# Patient Record
Sex: MALE
[De-identification: names, ages, dates, MRNs, and addresses within clinical notes are randomized per-mention and may not be internally consistent; named-entity substitution may affect disease eponyms.]

---

## 2018-03-26 ENCOUNTER — HOSPITAL ENCOUNTER (EMERGENCY)
Dept: HOSPITAL 97 - ER | Age: 28
Discharge: HOME | End: 2018-03-26
Payer: COMMERCIAL

## 2018-03-26 VITALS — OXYGEN SATURATION: 97 %

## 2018-03-26 VITALS — TEMPERATURE: 97.8 F

## 2018-03-26 VITALS — SYSTOLIC BLOOD PRESSURE: 128 MMHG | DIASTOLIC BLOOD PRESSURE: 92 MMHG

## 2018-03-26 DIAGNOSIS — J02.9: Primary | ICD-10-CM

## 2018-03-26 PROCEDURE — 87081 CULTURE SCREEN ONLY: CPT

## 2018-03-26 PROCEDURE — 99285 EMERGENCY DEPT VISIT HI MDM: CPT

## 2018-03-26 PROCEDURE — 87804 INFLUENZA ASSAY W/OPTIC: CPT

## 2018-03-26 PROCEDURE — 96372 THER/PROPH/DIAG INJ SC/IM: CPT

## 2018-03-26 PROCEDURE — 87070 CULTURE OTHR SPECIMN AEROBIC: CPT

## 2018-03-26 NOTE — ER
Nurse's Notes                                                                                     

 [facility]                                                                

Name: Koko Soto                                                                                  

Age: 27 yrs                                                                                       

Sex: Male                                                                                         

: 1990                                                                                   

MRN: [id number]                                                                                   

Arrival Date: 2018                                                                          

Time: 17:33                                                                                       

Account#: [id number]                                                                            

Bed 14                                                                                            

Private MD:                                                                                       

Diagnosis: Acute pharyngitis                                                                      

                                                                                                  

Presentation:                                                                                     

                                                                                             

17:35 Presenting complaint: Patient states: 2 weeks ago, i have been diagnosed with           hj  

      bronchitis, finished with Rx antibiotics; now the cough is back and i have this pain on     

      the middle of my chest; reports SOB; reports on and off fever and cold sweats;.             

      Transition of care: patient was not received from another setting of care. Onset of         

      symptoms was 2018. Care prior to arrival: None.                                   

17:35 Method Of Arrival: Ambulatory                                                             

17:35 Acuity: CAS 3                                                                           hj  

                                                                                                  

Triage Assessment:                                                                                

17:37 General: Appears in no apparent distress. uncomfortable, Behavior is calm, cooperative, hj  

      appropriate for age. Pain: Complains of pain in chest. Cardiovascular: Capillary refill     

      < 3 seconds Patient's skin is warm and dry.                                                 

                                                                                                  

Historical:                                                                                       

- Allergies:                                                                                      

17:37 No Known Allergies;                                                                     hj  

- Home Meds:                                                                                      

17:37 None [Active];                                                                          hj  

- PMHx:                                                                                           

17:37 Asthma;                                                                                 hj  

- PSHx:                                                                                           

17:37 None;                                                                                   hj  

                                                                                                  

- Immunization history:: Adult Immunizations up to date.                                          

- Social history:: Smoking status: Patient/guardian denies using tobacco.                         

                                                                                                  

                                                                                                  

Screenin:03 Abuse screen: Denies threats or abuse. Nutritional screening: No deficits noted.        tw2 

      Tuberculosis screening: No symptoms or risk factors identified. Fall Risk None              

      identified.                                                                                 

                                                                                                  

Assessment:                                                                                       

17:37 Pain: Pain does not radiate. Pain began a week ago.                                     hj  

18:00 General: Appears in no apparent distress. obese, Behavior is calm, cooperative,         tw2 

      appropriate for age. Pain: Complains of pain in chest Pain does not radiate. Also           

      complains of shortness of breath, cough. Neuro: Level of Consciousness is awake, alert,     

      obeys commands, Oriented to person, place, time, situation. Cardiovascular: Heart tones     

      S1 S2 Capillary refill < 3 seconds Patient's skin is warm and dry. Respiratory: Reports     

      shortness of breath cough that is non-productive, dry, Airway is patent Respiratory         

      effort is even, unlabored, Respiratory pattern is regular, symmetrical, Breath sounds       

      are clear bilaterally. GI: Abdomen is round non-distended, Bowel sounds present X 4         

      quads. GI: No signs and/or symptoms were reported involving the gastrointestinal            

      system. GI: No signs and/or symptoms were reported involving the gastrointestinal           

      system. : No signs and/or symptoms were reported regarding the genitourinary system.      

      EENT: Reports nasal congestion nasal discharge family member in house has Flu A. Derm:      

      No signs and/or symptoms reported regarding the dermatologic system. Musculoskeletal:       

      Circulation, motion, and sensation intact. Range of motion: intact in all extremities.      

19:25 Reassessment: Patient appears in no apparent distress at this time. Patient and/or      kb1 

      family updated on plan of care and expected duration. Pain level reassessed. Patient is     

      alert, oriented x 3, equal unlabored respirations, skin warm/dry/pink.                      

                                                                                                  

Vital Signs:                                                                                      

17:38  / 46; Pulse 85; Resp 20; Temp 97.8(TE); Pulse Ox 95% on R/A; Weight 158.76 kg;     

      Height 5 ft. 7 in. (170.18 cm); Pain 6/10;                                                  

18:02  / 84; Pulse 76; Resp 17; Pulse Ox 97% on R/A;                                    tw2 

18:51  / 86; Pulse 103; Resp 20; Pulse Ox 97% on R/A;                                   mh5 

19:26  / 92; Pulse 82; Resp 18; Pulse Ox 97% ;                                          kb1 

17:38 Body Mass Index 54.82 (158.76 kg, 170.18 cm)                                              

                                                                                                  

ED Course:                                                                                        

17:33 Patient arrived in ED.                                                                  mr  

17:36 Triage completed.                                                                       hj  

17:37 Arm band placed on right wrist.                                                         hj  

17:37 Patient maintains SpO2 saturation greater than 95% on room air.                         hj  

18:00 Mallika Coles, EDILIA is Primary Nurse.                                                        tw2 

18:01 Chaz Morris NP is PHCP.                                                           pm1 

18:01 Jose Brooks MD is Attending Physician.                                              pm1 

18:02 Bed in low position. Call light in reach. Cardiac monitor on. Pulse ox on. NIBP on.     tw2 

18:03 No provider procedures requiring assistance completed.                                  tw2 

18:23 Flu Sent.                                                                               tw2 

18:23 Strep Sent.                                                                             tw2 

19:10 Report given to EDILIA Villa.                                                            tw2 

19:14 Awaiting: awaiting 15 min abx watch prior to discharge.                                 tw2 

19:15 Primary Nurse role handed off by Mallika Coles RN                                         tw2 

19:25 Daisy Gan, RN is Primary Nurse.                                                   kb1 

19:54 Patient did not have IV access during this emergency room visit.                        kb1 

                                                                                                  

Administered Medications:                                                                         

19:10 Drug: Rocephin (cefTRIAXone) 1 grams Route: IM; Site: left deltoid;                     tw2 

19:53 Follow up: Response: No adverse reaction                                                kb1 

                                                                                                  

                                                                                                  

Outcome:                                                                                          

19:04 Discharge ordered by MD.                                                                pm1 

19:53 Discharged to home ambulatory.                                                          kb1 

19:53 Condition: stable                                                                           

19:53 Discharge instructions given to patient, Instructed on discharge instructions, follow       

      up and referral plans. medication usage, Demonstrated understanding of instructions,        

      follow-up care, medications, Prescriptions given X 2.                                       

19:54 Patient left the ED.                                                                    kb1 

                                                                                                  

Signatures:                                                                                       

Aparna Duarte Henry RN                      RN   hj                                                   

Chaz Morris NP                    NP   pm1                                                  

Mallika Coles RN RN   [facility]                                                  

Aparna Fernandez                              [facility]                                                  

Daisy Gan RN                     RN   kb1                                                  

                                                                                                  

Corrections: (The following items were deleted from the chart)                                    

17:40 17:38 Pulse 85bpm; Resp 20bpm; Pulse Ox 95% RA; Temp 97.8F Temporal; 158.76 kg; Height  hj  

      5 ft. 7 in.; BMI: 54.8; Pain 6/10; hj                                                       

                                                                                                  

**************************************************************************************************

## 2018-03-26 NOTE — EDPHYS
Physician Documentation                                                                           

 [facility]                                                                

Name: Koko Soto                                                                                  

Age: 27 yrs                                                                                       

Sex: Male                                                                                         

: 1990                                                                                   

MRN: [id number]                                                                                   

Arrival Date: 2018                                                                          

Time: 17:33                                                                                       

Account#: [id number]                                                                            

Bed 14                                                                                            

Private MD:                                                                                       

ED Physician Jose Brooks                                                                       

HPI:                                                                                              

                                                                                             

19:15 This 27 yrs old  Male presents to ER via Ambulatory with complaints of Cough,   pm1 

      Chest Pain.                                                                                 

19:15 The patient or guardian reports cough, with no sputum. Onset: The symptoms/episode      pm1 

      began/occurred 3 day(s) ago. Severity of symptoms: in the emergency department the          

      symptoms are actually worse. Modifying factors: The symptoms are alleviated by nothing,     

      the symptoms are aggravated by nothing. Associated signs and symptoms: Pertinent            

      positives: chest pain, with cough, sore throat, Pertinent negatives: diarrhea, fever,       

      rhinorrhea.                                                                                 

19:15 The patient has been recently seen by a physician: 2 week(s) ago, [place] [facility]. Patient  pm1 

      with onset of symptoms three days ago with sore throat.                                     

                                                                                                  

Historical:                                                                                       

- Allergies:                                                                                      

17:37 No Known Allergies;                                                                     hj  

- Home Meds:                                                                                      

17:37 None [Active];                                                                          hj  

- PMHx:                                                                                           

17:37 Asthma;                                                                                 hj  

- PSHx:                                                                                           

17:37 None;                                                                                   hj  

                                                                                                  

- Immunization history:: Adult Immunizations up to date.                                          

- Social history:: Smoking status: Patient/guardian denies using tobacco.                         

                                                                                                  

                                                                                                  

ROS:                                                                                              

19:15 Constitutional: Negative for fever, chills, and weight loss, Eyes: Negative for injury, pm1 

      pain, redness, and discharge.                                                               

19:15 Neck: Negative for injury, pain, and swelling, Cardiovascular: Negative for chest pain,     

      palpitations, and edema.                                                                    

19:15 Abdomen/GI: Negative for abdominal pain, nausea, vomiting, diarrhea, and constipation,      

      Back: Negative for injury and pain, : Negative for injury, bleeding, discharge, and       

      swelling, MS/Extremity: Negative for injury and deformity, Skin: Negative for injury,       

      rash, and discoloration, Neuro: Negative for headache, weakness, numbness, tingling,        

      and seizure.                                                                                

19:15 ENT: Positive for sore throat, Negative for ear pain, rhinorrhea, difficulty                

      swallowing, difficulty handling secretions, hoarseness.                                     

19:15 Respiratory: Positive for cough, Negative for shortness of breath, sputum production,       

      wheezing.                                                                                   

                                                                                                  

Exam:                                                                                             

19:15 Constitutional:  This is a well developed, well nourished patient who is awake, alert,  pm1 

      and in no acute distress. Head/Face:  Normocephalic, atraumatic. Eyes:  Pupils equal        

      round and reactive to light, extra-ocular motions intact.  Lids and lashes normal.          

      Conjunctiva and sclera are non-icteric and not injected.  Cornea within normal limits.      

      Periorbital areas with no swelling, redness, or edema.                                      

19:15 Neck:  Trachea midline, no thyromegaly or masses palpated, and no cervical                  

      lymphadenopathy.  Supple, full range of motion without nuchal rigidity, or vertebral        

      point tenderness.  No Meningismus. Chest/axilla:  Normal chest wall appearance and          

      motion.  Nontender with no deformity.  No lesions are appreciated. Cardiovascular:          

      Regular rate and rhythm with a normal S1 and S2.  No gallops, murmurs, or rubs.  Normal     

      PMI, no JVD.  No pulse deficits. Respiratory:  Lungs have equal breath sounds               

      bilaterally, clear to auscultation and percussion.  No rales, rhonchi or wheezes noted.     

       No increased work of breathing, no retractions or nasal flaring. Abdomen/GI:  Soft,        

      non-tender, with normal bowel sounds.  No distension or tympany.  No guarding or            

      rebound.  No evidence of tenderness throughout. Back:  No spinal tenderness.  No            

      costovertebral tenderness.  Full range of motion. Skin:  Warm, dry with normal turgor.      

      Normal color with no rashes, no lesions, and no evidence of cellulitis. MS/ Extremity:      

      Pulses equal, no cyanosis.  Neurovascular intact.  Full, normal range of motion.            

19:15 ENT: External ear(s): are unremarkable, Ear canal(s): are normal, TM's: are normal,         

      Nose: is normal, Mouth: is normal, Posterior pharynx: Airway: normal, no evidence of        

      obstruction, patent, Tonsils: bilaterally enlarged, with erythema, with exudate, no         

      ulcerations, peritonsillar mass, is not appreciated, pooling of secretions, is not          

      appreciated.                                                                                

19:15 Neuro: Orientation: is normal, Motor: is normal, moves all fours, strength is normal,       

      strength is 5/5 in all extremities, Gait: is steady, at a normal pace, without              

      difficulty.                                                                                 

                                                                                                  

Vital Signs:                                                                                      

17:38  / 46; Pulse 85; Resp 20; Temp 97.8(TE); Pulse Ox 95% on R/A; Weight 158.76 kg;   hj  

      Height 5 ft. 7 in. (170.18 cm); Pain 6/10;                                                  

18:02  / 84; Pulse 76; Resp 17; Pulse Ox 97% on R/A;                                    tw2 

18:51  / 86; Pulse 103; Resp 20; Pulse Ox 97% on R/A;                                   mh5 

19:26  / 92; Pulse 82; Resp 18; Pulse Ox 97% ;                                          kb1 

17:38 Body Mass Index 54.82 (158.76 kg, 170.18 cm)                                            hj  

                                                                                                  

MDM:                                                                                              

18:01 Patient medically screened.                                                             pm1 

19:03 Data reviewed: vital signs. Data interpreted: Pulse oximetry: on room air is 97 %.      pm1 

      Interpretation: normal. Counseling: I had a detailed discussion with the patient and/or     

      guardian regarding: the historical points, exam findings, and any diagnostic results        

      supporting the discharge/admit diagnosis, lab results, the need for outpatient follow       

      up, to return to the emergency department if symptoms worsen or persist or if there are     

      any questions or concerns that arise at home.                                               

                                                                                                  

                                                                                             

18:11 Order name: Flu; Complete Time: 19:01                                                   pm1 

                                                                                             

18:11 Order name: Strep; Complete Time: 19:01                                                 pm1 

                                                                                             

18:37 Order name: Throat Culture                                                              EDMS

                                                                                                  

Administered Medications:                                                                         

19:10 Drug: Rocephin (cefTRIAXone) 1 grams Route: IM; Site: left deltoid;                     tw2 

19:53 Follow up: Response: No adverse reaction                                                kb1 

                                                                                                  

                                                                                                  

Disposition:                                                                                      

18 19:04 Discharged to Home. Impression: Acute pharyngitis.                                 

- Condition is Stable.                                                                            

- Discharge Instructions: Pharyngitis.                                                            

- Prescriptions for Zithromax Z- Bhaskar 250 mg Oral Tablet - take 1 tablet by ORAL route             

  as directed for 5 days Day 1 - take two (2) tablets one time. Day 2, 3, 4 , 5 take              

  one (1) tablet once daily.; 6 tablet. Guaifenesin AC 10- 100 mg/5 mL Oral Liquid -              

  take 10 milliliter by ORAL route every 4 hours As needed; 240 milliliter.                       

- Medication Reconciliation Form, Thank You Letter, Antibiotic Education, Work release            

  form form.                                                                                      

- Follow up: Emergency Department; When: As needed; Reason: Worsening of condition.               

  Follow up: Private Physician; When: 2 - 3 days; Reason: Recheck today's complaints,             

  Continuance of care, Re-evaluation by your physician.                                           

- Problem is new.                                                                                 

- Symptoms have improved.                                                                         

                                                                                                  

                                                                                                  

                                                                                                  

Addendum:                                                                                         

2018                                                                                        

     07:29 Co-signature as Attending Physician, Jose Brooks MD.                                  g
s

                                                                                                  

Signatures:                                                                                       

Dispatcher [employer]                           EDMS                                                 

Mook Nuñez, RN                      RN   hj                                                   

Chaz Morris NP                    NP   pm1                                                  

Mallika Coles RN                          RN   tw2                                                  

Jose Brooks MD MD                                                      

Daisy Gan, RN                     RN   kb1                                                  

                                                                                                  

**************************************************************************************************

## 2018-10-02 ENCOUNTER — HOSPITAL ENCOUNTER (EMERGENCY)
Dept: HOSPITAL 97 - ER | Age: 28
Discharge: HOME | End: 2018-10-02
Payer: COMMERCIAL

## 2018-10-02 VITALS — SYSTOLIC BLOOD PRESSURE: 153 MMHG | DIASTOLIC BLOOD PRESSURE: 92 MMHG | OXYGEN SATURATION: 97 % | TEMPERATURE: 97.2 F
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
## 2018-10-02 DIAGNOSIS — S50.861A: Primary | ICD-10-CM
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
## 2018-10-02 PROCEDURE — 99283 EMERGENCY DEPT VISIT LOW MDM: CPT

## 2018-10-02 NOTE — EDPHYS
Physician Documentation                                                                           

 [facility]                                                                

Name: Koko Soto                                                                                  

Age: 28 yrs                                                                                       

Sex: Male                                                                                         

: 1990                                                                                   

MRN: [id number]                                                                                   

Arrival Date: 10/02/2018                                                                          

Time: 09:33                                                                                       

Account#: [id number]                                                                            

Bed 12                                                                                            

Private MD: Madhu Cochran E ED Physician Josafat Bautista                                                                      

HPI:                                                                                              

10/02                                                                                             

10:30 This 28 yrs old  Male presents to ER via Ambulatory with complaints of Insect   shama 

      Bite.                                                                                       

10:30 The patient or guardian complains of an abrasion, pain, swelling. The complaints affect shama 

      the palmar aspect of right forearm. Context: The problem was sustained at home. Onset:      

      The symptoms/episode began/occurred 2 day(s) ago. Treatment prior to arrival includes:      

      no previous treatment. Associated signs and symptoms: The patient has no apparent           

      associated signs or symptoms. The patient has not experienced similar symptoms in the       

      past.                                                                                       

                                                                                                  

Historical:                                                                                       

- Allergies:                                                                                      

09:43 No Known Allergies;                                                                     ss  

- Home Meds:                                                                                      

:43 None [Active];                                                                          ss  

- PMHx:                                                                                           

:43 Asthma;                                                                                 ss  

- PSHx:                                                                                           

:43 None;                                                                                   ss  

                                                                                                  

- Immunization history:: Adult Immunizations unknown.                                             

- Social history:: Smoking status: Patient/guardian denies using tobacco.                         

- Ebola Screening: : Patient denies exposure to infectious person Patient denies travel           

  to an Ebola-affected area in the 21 days before illness onset.                                  

- Family history:: not pertinent.                                                                 

                                                                                                  

                                                                                                  

ROS:                                                                                              

10:30 Constitutional: Negative for fever, chills, and weight loss, Eyes: Negative for injury, shama 

      pain, redness, and discharge, ENT: Negative for injury, pain, and discharge, Neck:          

      Negative for injury, pain, and swelling, Cardiovascular: Negative for chest pain,           

      palpitations, and edema, Respiratory: Negative for shortness of breath, cough,              

      wheezing, and pleuritic chest pain, Abdomen/GI: Negative for abdominal pain, nausea,        

      vomiting, diarrhea, and constipation, Back: Negative for injury and pain, : Negative      

      for injury, bleeding, discharge, and swelling, Skin: Negative for injury, rash, and         

      discoloration, Neuro: Negative for headache, weakness, numbness, tingling, and seizure,     

      Psych: Negative for depression, anxiety, suicide ideation, homicidal ideation, and          

      hallucinations, Allergy/Immunology: Negative for hives, rash, and allergies, Endocrine:     

      Negative for neck swelling, polydipsia, polyuria, polyphagia, and marked weight             

      changes, Hematologic/Lymphatic: Negative for swollen nodes, abnormal bleeding, and          

      unusual bruising.                                                                           

10:30 MS/extremity: Positive for pain, swelling, tenderness, of the palmar aspect of right        

      forearm.                                                                                    

                                                                                                  

Exam:                                                                                             

10:30 Constitutional:  This is a well developed, well nourished patient who is awake, alert,  shama 

      and in no acute distress. Head/Face:  Normocephalic, atraumatic. Eyes:  Pupils equal        

      round and reactive to light, extra-ocular motions intact.  Lids and lashes normal.          

      Conjunctiva and sclera are non-icteric and not injected.  Cornea within normal limits.      

      Periorbital areas with no swelling, redness, or edema. ENT:  Nares patent. No nasal         

      discharge, no septal abnormalities noted.  Tympanic membranes are normal and external       

      auditory canals are clear.  Oropharynx with no redness, swelling, or masses, exudates,      

      or evidence of obstruction, uvula midline.  Mucous membranes moist. Neck:  Trachea          

      midline, no thyromegaly or masses palpated, and no cervical lymphadenopathy.  Supple,       

      full range of motion without nuchal rigidity, or vertebral point tenderness.  No            

      Meningismus. Chest/axilla:  Normal chest wall appearance and motion.  Nontender with no     

      deformity.  No lesions are appreciated. Cardiovascular:  Regular rate and rhythm with a     

      normal S1 and S2.  No gallops, murmurs, or rubs.  Normal PMI, no JVD.  No pulse             

      deficits. Respiratory:  Lungs have equal breath sounds bilaterally, clear to                

      auscultation and percussion.  No rales, rhonchi or wheezes noted.  No increased work of     

      breathing, no retractions or nasal flaring. Abdomen/GI:  Soft, non-tender, with normal      

      bowel sounds.  No distension or tympany.  No guarding or rebound.  No evidence of           

      tenderness throughout. Back:  No spinal tenderness.  No costovertebral tenderness.          

      Full range of motion. Neuro:  Awake and alert, GCS 15, oriented to person, place, time,     

      and situation.  Cranial nerves II-XII grossly intact.  Motor strength 5/5 in all            

      extremities.  Sensory grossly intact.  Cerebellar exam normal.  Normal gait. Psych:         

      Awake, alert, with orientation to person, place and time.  Behavior, mood, and affect       

      are within normal limits.                                                                   

10:30 Skin: Appearance: Color: normal in color, Temperature: warm, Moisture: normal moisture,     

      petechiae, not noted, ecchymosis, not noted, injury, abrasion(s), bite(s), superficial.     

                                                                                                  

Vital Signs:                                                                                      

09:43  / 92; Pulse 83; Resp 15; Temp 97.2(TE); Pulse Ox 97% on R/A; Weight 136.08 kg;   ss  

      Height 5 ft. 7 in. (170.18 cm); Pain 0/10;                                                  

09:43 Body Mass Index 46.99 (136.08 kg, 170.18 cm)                                              

                                                                                                  

MDM:                                                                                              

09:52 Patient medically screened.                                                             [facility] 

10:32 Data reviewed: vital signs, nurses notes.                                               [facility] 

                                                                                                  

Administered Medications:                                                                         

10:34 Drug: Bactrim (160 mg-800 mg (DS) 1 tablet Route: PO;                                     

10:40 Follow up: Response: No adverse reaction; Medication administered at discharge.           

10:40 Drug: Bactroban Ointment 2 % 1 application Route: Topical; Site: affected area;           

                                                                                                  

                                                                                                  

Disposition:                                                                                      

10/02/18 10:32 Discharged to Home. Impression: Insect bite (nonvenomous) of forearm.              

- Condition is Stable.                                                                            

- Discharge Instructions: Insect Bite, Easy-to-Read, Insect Bite.                                 

- Prescriptions for Bactroban 2 % Topical Ointment - Apply to affected area 1                     

  application by TOPICAL route every 12 hours; 30 gram. Claritin 10 mg Oral Tablet -              

  take 1 tablet by ORAL route once daily As needed; 20 tablet. Bactrim - 160 mg             

  Oral Tablet - take 1 tablet by ORAL route every 12 hours for 7 days; 14 tablet.                 

- Medication Reconciliation Form, Thank You Letter, Antibiotic Education, Prescription            

  Opioid Use form.                                                                                

- Follow up: Madhu Cochran MD; When: 2 - 3 days; Reason: Recheck today's complaints,            

  Continuance of care, Re-evaluation by your physician.                                           

                                                                                                  

                                                                                                  

                                                                                                  

Signatures:                                                                                       

Josafat Bautista MD MD cha Smirch, Shelby RN                      RN   ss                                                   

                                                                                                  

Corrections: (The following items were deleted from the chart)                                    

10:41 10:32 10/02/2018 10:32 Discharged to Home. Impression: Insect bite (nonvenomous) of     ss  

      forearm. Condition is Stable. Forms are Medication Reconciliation Form, Thank You           

      Letter, Antibiotic Education, Prescription Opioid Use. Follow up: Madhu Cochran; When:      

      2 - 3 days; Reason: Recheck today's complaints, Continuance of care, Re-evaluation by       

      your physician. shama                                                                         

                                                                                                  

**************************************************************************************************

## 2021-01-14 ENCOUNTER — HOSPITAL ENCOUNTER (EMERGENCY)
Dept: HOSPITAL 97 - ER | Age: 31
Discharge: HOME | End: 2021-01-14
Payer: SELF-PAY

## 2021-01-14 VITALS — DIASTOLIC BLOOD PRESSURE: 81 MMHG | SYSTOLIC BLOOD PRESSURE: 109 MMHG | OXYGEN SATURATION: 100 %

## 2021-01-14 VITALS — TEMPERATURE: 98.8 F

## 2021-01-14 DIAGNOSIS — Z20.822: ICD-10-CM

## 2021-01-14 DIAGNOSIS — R19.7: Primary | ICD-10-CM

## 2021-01-14 LAB
ALBUMIN SERPL BCP-MCNC: 3.7 G/DL (ref 3.4–5)
ALP SERPL-CCNC: 98 U/L (ref 45–117)
ALT SERPL W P-5'-P-CCNC: 40 U/L (ref 12–78)
AST SERPL W P-5'-P-CCNC: 19 U/L (ref 15–37)
BUN BLD-MCNC: 13 MG/DL (ref 7–18)
GLUCOSE SERPLBLD-MCNC: 78 MG/DL (ref 74–106)
HCT VFR BLD CALC: 46.2 % (ref 39.6–49)
LIPASE SERPL-CCNC: 216 U/L (ref 73–393)
LYMPHOCYTES # SPEC AUTO: 2.5 K/UL (ref 0.7–4.9)
PMV BLD: 8.5 FL (ref 7.6–11.3)
POTASSIUM SERPL-SCNC: 3.9 MMOL/L (ref 3.5–5.1)
RBC # BLD: 5.13 M/UL (ref 4.33–5.43)
SARS-COV-2 RNA RESP QL NAA+PROBE: NEGATIVE

## 2021-01-14 PROCEDURE — 85025 COMPLETE CBC W/AUTO DIFF WBC: CPT

## 2021-01-14 PROCEDURE — 80048 BASIC METABOLIC PNL TOTAL CA: CPT

## 2021-01-14 PROCEDURE — 80076 HEPATIC FUNCTION PANEL: CPT

## 2021-01-14 PROCEDURE — 99283 EMERGENCY DEPT VISIT LOW MDM: CPT

## 2021-01-14 PROCEDURE — 96360 HYDRATION IV INFUSION INIT: CPT

## 2021-01-14 PROCEDURE — 0240U: CPT

## 2021-01-14 PROCEDURE — 96361 HYDRATE IV INFUSION ADD-ON: CPT

## 2021-01-14 PROCEDURE — 83690 ASSAY OF LIPASE: CPT

## 2021-01-14 PROCEDURE — 36415 COLL VENOUS BLD VENIPUNCTURE: CPT

## 2021-01-14 NOTE — XMS REPORT
Continuity of Care Document

                           Created on:2021



Patient:ANISHA CÁRDENAS

Sex:Male

:1990

External Reference #:[id number]





Demographics







                          Address                   [address] [place] [address]

 

                          Home Phone                [phone]

 

                          Work Phone                [phone]

 

                          Mobile Phone              [phone]

 

                          Email Address             [address]

 

                          Preferred Language        English

 

                          Marital Status            Unknown

 

                          Gnosticism Affiliation     Unknown

 

                          Race                      Unknown

 

                          Additional Race(s)        Unavailable



                                                    White

 

                          Ethnic Group              Unknown









Author







                          Organization              [facility]

t

 

                          Address                   [address]



                                                    [place] [address]

 

                          Phone                     [phone]









Support







                Name            Relationship    Address         Phone

 

                Yg        Mother          [address]     [phone]



                                                [place] [address] 









Care Team Providers







                    Name                Role                Phone

 

                    Jojo YEBOAH             Attending Clinician Unavailable

 

                    Juan CAICEDO,  F    Attending Clinician [phone]

 

                    Tho CAICEDO,  B    Attending Clinician [phone]

 

                    Doctor Unassigned,  Name Attending Clinician Unavailable

 

                    Harry CAICEDO         Attending Clinician [phone]

 

                    Collin MARIA  S       Attending Clinician [phone]









Problems

This patient has no known problems.



Allergies, Adverse Reactions, Alerts

This patient has no known allergies or adverse reactions.



Medications

This patient has no known medications.



Procedures

This patient has no known procedures.



Encounters







        Start   End     Encounter Admission Attending Care    Care    Encounter 

Source



        Date/Time Date/Time Type    Type    Clinicians Facility Department ID   

   

 

        2020-10-30 2020-10-30 Letter          Susan Uribe    [id number]

[id number] 



        00:00:00 00:00:00 (Out)                   [facility]   [id number]         



                                                [facility] [id number]         



                                                        [phone]         



                                                        019             

 

        2020-10-29 2020-10-29 Emergency         SALOME Martinez    [id number]

[id number] 



        17:25:00 21:45:00                 Ju Turk 350.1.13.10        

 



                                                [facility] [id number]         



                                                [place]  [phone]         



                                                        084             

 

        2020 Emergency         SALOME Nettles    [id number]

[id number] 



        12:45:55 13:57:00                 Roderick Turk [id number]         



                                                [facility] [id number]         



                                                [place]  [phone]         



                                                        084             

 

        2020 Orders          Doctor  KASH    [id number]

[id number] 



        00:00:00 00:00:00 Only            Unassigned, [facility]   350.1.13.10       

  



                                        [facility] [id number]         



                                                        [phone]         



                                                        009             

 

        2020 Emergency         CatherineLucretia silva [facility]    [id number]

[id number] 



        16:59:17 21:36:00                 Nessa Polanco 350.1.13.10 

        



                                        Nessa Polanco [id number]    

     



                                                [place]  [phone]         



                                                        084             







Results

This patient has no known results.

## 2021-01-14 NOTE — XMS REPORT
Summary of Care

                           Created on:2020



Patient:Koko Soto

Sex:Male

:1990

External Reference #:[id number]





Demographics







                          Address                   [address] [place] [address]

 

                          Mobile Phone              [phone]

 

                          Home Phone                [phone]

 

                          Phone                     [phone]

 

                          Email Address             [email or web address]

 

                          Email Address             [email or web address]

 

                          Preferred Language        English

 

                          Marital Status            Single

 

                          Hinduism Affiliation     Unknown

 

                          Race                      White

 

                          Ethnic Group              Not  or 









Author







                          Organization              [facility]

 

                          Address                   [address] [place] [id number]









Support







                Name            Relationship    Address         Phone

 

                Maite Ronquillo Unavailable     [address]     [phone]



                                                [place] [address] 









Care Team Providers







                    Name                Role                Phone

 

                    Madhu Cochran    Primary Care Provider [phone]









Encounter Details







             Date         Type         Department   Care Team    Description

 

                    10/30/2020          Letter (Out)        [facility]



                          Susan Uribe RN            



                                                            [address] [place] [id number] [phone]              







Allergies

No Known Allergiesdocumented as of this encounter (statuses as of 10/30/2020)



Medications







          Medication Sig       Dispensed Refills   Start Date End Date  Status

 

          albuterol (VENTOLIN) 90 Inhale 2 Puffs 8.5 g     0         2015 

          Active



          mcg/actuation inhaler every 4 (four)                                  

       



                    hours as needed                                         



                    for Wheezing or                                         



                    Shortness of                                         



                    Breath.                                           

 

          albuterol 90 Inhale 2 Puffs 8.5 g     0         2018           A

ctive



          mcg/actuation inhaler every 4 (four)                                  

       



                    hours as needed                                         



                    for Wheezing or                                         



                    Shortness of                                         



                    Breath.                                           

 

          azithromycin 250 mg Take 1 tablet by 6 tablet  0         2018   

        Active



          tablet    mouth                                             



                    SEE-INSTRUCTIONS.                                         



                    Take 500 mg day                                         



                    1, then 250 mg                                         



                    days 2 to 5.                                         

 

          codeine-guaifenesin Take 10 mL by 240 mL    0         2018      

     Active



           mg/5 mL solution mouth every 6                                 

        



                    (six) hours as                                         



                    needed for Cough.                                         

 

          dicyclomine 20 mg Take 1 tablet by 20 tablet 0         2020     

      Active



          tabletIndications: mouth every 6                                      

   



          Generalized abdominal (six) hours as                                  

       



          pain      needed for                                         



                    Abdominal pain.                                         

 

          ondansetron (ZOFRAN) 4 Take 1 tablet by 12 tablet 0         2020

           Active



          mg tabletIndications: mouth every 8                                   

      



          Non-intractable (eight) hours as                                      

   



          vomiting with nausea, needed for Nausea                               

          



          unspecified vomiting and Vomiting                                     

    



          type      (N/V).                                            

 

          albuterol 90 Inhale 2 Puffs 8.5 g     0         2020           A

ctive



          mcg/actuation every 4 (four)                                         



          inhalerIndications: hours as needed                                   

      



          Suspected COVID-19 for Wheezing or                                    

     



          virus infection Shortness of                                         



                    Breath.                                           

 

          dextromethorphan-guaife Take 10 mL by 1 Bottle  0         2020  

         Active



          nesin  mg/5 mL mouth every 6                                    

     



          syrupIndications: (six) hours as                                      

   



          Suspected COVID-19 needed for Cough.                                  

       



          virus infection                                                   

 

          codeine-guaifenesin Take 5 mL by 118 mL    0         2020       

    Active



           mg/5 mL mouth every 6                                         



          solutionIndications: (six) hours as                                   

      



          Suspected COVID-19 needed for Cough                                   

      



          virus infection (bedtime).                                         

 

          ondansetron 4 mg Take 1 tablet by 12 tablet 0         10/29/2020      

     Active



          disintegrating mouth every 8                                         



          tabletIndications: (eight) hours as                                   

      



          Nausea    needed for Nausea                                         



                    and Vomiting                                         



                    (N/V).                                            



documented as of this encounter (statuses as of 10/30/2020)



Active Problems

No known active problemsdocumented as of this encounter (statuses as of 
10/30/2020)



Social History







             Tobacco Use  Types        Packs/Day    Years Used   Date

 

             Never Assessed                                        









                          Sex Assigned at Birth     Date Recorded

 

                          Not on file               









                    COVID-19 Exposure   Response            Date Recorded

 

                    In the last month, have you been in contact with No / Unsure

         10/29/2020  5:19 PM

CDT



                    someone who was confirmed or suspected to have              

       



                    Coronavirus / COVID-19?                     



documented as of this encounter



Last Filed Vital Signs

Not on filedocumented in this encounter



Plan of Treatment







                Health Maintenance Due Date        Last Done       Comments

 

                VARICELLA VACCINES (1 of 2 - 1990                      



                2-dose childhood series)                                 

 

                Depression Screening 2001                      

 

                DTaP,Tdap,and Td Vaccines (1 - 2008                      



                Tdap)                                           

 

                INFLUENZA VACCINE (#1) 2020                      

 

                PNEUMOCOCCAL 0-64 YEARS COMBINED Aged Out                       

 No longer eligible based on



                SERIES                                          patient's age to

 complete this



                                                                topic



documented as of this encounter



Results

Not on filedocumented in this encounter



Additional Health Concerns







                Infection       Onset Date      Last Indicated  Resolved Time

 

                COVID-19 Rule Out 10/29/2020      10/29/2020      10/30/2020  4:

23 PM CDT



documented as of this encounter

## 2021-01-14 NOTE — XMS REPORT
Summary of Care

                           Created on:2020



Patient:Koko Soto

Sex:Male

:1990

External Reference #:[id number]





Demographics







                          Address                   [address] [place] [address]

 

                          Mobile Phone              [phone]

 

                          Home Phone                [phone]

 

                          Phone                     [phone]

 

                          Email Address             [email or web address]

 

                          Email Address             [email or web address]

 

                          Preferred Language        English

 

                          Marital Status            Single

 

                          Druze Affiliation     Unknown

 

                          Race                      White

 

                          Ethnic Group              Not  or 









Author







                          Organization              [facility]

 

                          Address                   [address] [place] [address]









Support







                Name            Relationship    Address         Phone

 

                Maite Ronquillo Unavailable     [address]     [phone]



                                                [place] [address] 









Care Team Providers







                    Name                Role                Phone

 

                    Madhu Cochran    Primary Care Provider [phone]









Reason for Referral

MRI/CAT Scan (STAT)





           Status     Reason     Specialty  Diagnoses / Referred By Referred To



                                            Procedures Contact    Contact

 

                New Request                     Diagnostic      Diagnoses



Diarrhea, unspecified type



Epigastric pain           Ibikunle,                 



                                                Radiology       



Procedures



CT ABDOMEN PELVIS W CONTRAST            Folusho F, FNP



                                        



                                                                 [address]



                                        



                                                                 [address]



                                        



                                                       [place] 



                                                                 [address]



                                        



                                                       Phone:     



                                                                 [phone]



                                        



                                                       Fax:       



                                                       [phone] 









Reason for Visit







                          Reason                    Comments

 

                          Diarrhea                  x 2 days

 

                          Chills                    



Auth/Cert





           Status     Reason     Specialty  Diagnoses / Referred By Referred To



                                            Procedures Contact    Contact

 

                                 Emergency Medicine                       [facility]

ergency



                                                                  Dept







                                                                  [address] [place], [place]



                                                                  [address]







                                                                  Phone:



                                                                  [phone]







                                                                  Fax: [phone]









Encounter Details







             Date         Type         Department   Care Team    Description

 

             10/29/2020   Emergency    [facility] [place]le, Folusho Diarrhea,

 unspecified type 

(Primary Dx);



                                                            Department



                                        F, FNP



                                        Epigastric pain;



                                                [address] [place] [address] [address]



                                        Gastroenteritis;



                                                            Drive



                                        [address]



                                        [place] [address]



                          [place]             



                                                [phone] [address] [phone] (Fax) 







Allergies

No Known Allergiesdocumented as of this encounter (statuses as of 10/29/2020)



Medications







          Medication Sig       Dispensed Refills   Start Date End Date  Status

 

          albuterol (VENTOLIN) 90 Inhale 2 Puffs 8.5 g     0         2015 

          Active



          mcg/actuation inhaler every 4 (four)                                  

       



                    hours as needed                                         



                    for Wheezing or                                         



                    Shortness of                                         



                    Breath.                                           

 

          albuterol 90 Inhale 2 Puffs 8.5 g     0         2018           A

ctive



          mcg/actuation inhaler every 4 (four)                                  

       



                    hours as needed                                         



                    for Wheezing or                                         



                    Shortness of                                         



                    Breath.                                           

 

          azithromycin 250 mg Take 1 tablet by 6 tablet  0         2018   

        Active



          tablet    mouth                                             



                    SEE-INSTRUCTIONS.                                         



                    Take 500 mg day                                         



                    1, then 250 mg                                         



                    days 2 to 5.                                         

 

          codeine-guaifenesin Take 10 mL by 240 mL    0         2018      

     Active



           mg/5 mL solution mouth every 6                                 

        



                    (six) hours as                                         



                    needed for Cough.                                         

 

          dicyclomine 20 mg Take 1 tablet by 20 tablet 0         2020     

      Active



          tabletIndications: mouth every 6                                      

   



          Generalized abdominal (six) hours as                                  

       



          pain      needed for                                         



                    Abdominal pain.                                         

 

          ondansetron (ZOFRAN) 4 Take 1 tablet by 12 tablet 0         2020

           Active



          mg tabletIndications: mouth every 8                                   

      



          Non-intractable (eight) hours as                                      

   



          vomiting with nausea, needed for Nausea                               

          



          unspecified vomiting and Vomiting                                     

    



          type      (N/V).                                            

 

          albuterol 90 Inhale 2 Puffs 8.5 g     0         2020           A

ctive



          mcg/actuation every 4 (four)                                         



          inhalerIndications: hours as needed                                   

      



          Suspected COVID-19 for Wheezing or                                    

     



          virus infection Shortness of                                         



                    Breath.                                           

 

          dextromethorphan-guaife Take 10 mL by 1 Bottle  0         2020  

         Active



          nesin  mg/5 mL mouth every 6                                    

     



          syrupIndications: (six) hours as                                      

   



          Suspected COVID-19 needed for Cough.                                  

       



          virus infection                                                   

 

          codeine-guaifenesin Take 5 mL by 118 mL    0         2020       

    Active



           mg/5 mL mouth every 6                                         



          solutionIndications: (six) hours as                                   

      



          Suspected COVID-19 needed for Cough                                   

      



          virus infection (bedtime).                                         

 

          ondansetron 4 mg Take 1 tablet by 12 tablet 0         10/29/2020      

     Active



          disintegrating mouth every 8                                         



          tabletIndications: (eight) hours as                                   

      



          Nausea    needed for Nausea                                         



                    and Vomiting                                         



                    (N/V).                                            



documented as of this encounter (statuses as of 10/29/2020)



Active Problems

No known active problemsdocumented as of this encounter (statuses as of 
10/29/2020)



Social History







             Tobacco Use  Types        Packs/Day    Years Used   Date

 

             Never Assessed                                        









                          Sex Assigned at Birth     Date Recorded

 

                          Not on file               









                    COVID-19 Exposure   Response            Date Recorded

 

                    In the last month, have you been in contact with No / Unsure

         10/29/2020  5:19 PM

CDT



                    someone who was confirmed or suspected to have              

       



                    Coronavirus / COVID-19?                     



documented as of this encounter



Last Filed Vital Signs







                Vital Sign      Reading         Time Taken      Comments

 

                Blood Pressure  147/93          10/29/2020  8:00 PM CDT 

 

                Pulse           75              10/29/2020  8:00 PM CDT 

 

                Temperature     37.2 C (99 F) 10/29/2020  5:21 PM CDT 

 

                Respiratory Rate 18              10/29/2020  8:00 PM CDT 

 

                Oxygen Saturation 96%             10/29/2020  8:00 PM CDT 

 

                Inhaled Oxygen Concentration -               -               

 

                Weight          176 kg (388 lb) 10/29/2020  5:23 PM CDT 

 

                Height          170.2 cm (5' 7") 10/29/2020  5:23 PM CDT 

 

                Body Mass Index 60.77           10/29/2020  5:23 PM CDT 



documented in this encounter



Discharge Instructions

Ju Gordon FNP - 10/29/2020Formatting of this note might 
be different from the original.

You were seen today for

Chief Complaint

Patient presents with

 Diarrhea

  x 2 days

 Chills



Your ER diagnosis was

  ICD-10-CM ICD-9-CM

1. Diarrhea, unspecified type  R19.7 787.91

2. Epigastric pain  R10.13 789.06

3. Gastroenteritis  K52.9 558.9



NO LIFE-THREATENING FINDINGS ON TODAY'S EXAM.



YOUR PRESCRIPTIONS :



Medication List





ASK your doctor about these medications



* albuterol 90 mcg/actuation inhaler

Commonly known as: VENTOLIN

Inhale 2 Puffs every 4 (four) hours as needed for Wheezing or Shortness of 
Breath.



* albuterol 90 mcg/actuation inhaler

Commonly known as: VENTOLIN

Inhale 2 Puffs every 4 (four) hours as needed for Wheezing or Shortness of 
Breath.



* albuterol 90 mcg/actuation inhaler

Commonly known as: VENTOLIN

Inhale 2 Puffs every 4 (four) hours as needed for Wheezing or Shortness of 
Breath.



azithromycin 250 mg tablet

Commonly known as: ZITHROMAX

Take 1 tablet by mouth SEE-INSTRUCTIONS. Take 500 mg day 1, then 250 mg days 2 
to 5.



* codeine-guaifenesin  mg/5 mL solution

Commonly known as: ROBITUSSIN AC

Take 10 mL by mouth every 6 (six) hours as needed for Cough.



* codeine-guaifenesin  mg/5 mL solution

Commonly known as: ROBITUSSIN AC

Take 5 mL by mouth every 6 (six) hours as needed for Cough (bedtime).



dextromethorphan-guaifenesin  mg/5 mL syrup

Commonly known as: ROBITUSSIN-DM

Take 10 mL by mouth every 6 (six) hours as needed for Cough.



dicyclomine 20 mg tablet

Commonly known as: BENTYL

Take 1 tablet by mouth every 6 (six) hours as needed for Abdominal pain.



ondansetron 4 mg tablet

Commonly known as: Zofran

Take 1 tablet by mouth every 8 (eight) hours as needed for Nausea and Vomiting 
(N/V).







 * This list has 5 medication(s) that are the same as other medications 
prescribed for you. Read thedirections carefully, and ask your doctor or other 
care provider to review them with you.











ER precautions and follow up :

1.  Return to ER if your symptoms should worsen or fail to improve within 72 
hours.

2.  The care provided in the emergency room was for acute problems only.

3.  You should follow up with your primary care provider within 72 hours.

4.  Fill and take all your medications as prescribed.

5.  Make sure you are staying adequately hydrated.



Busque attencion immediatamente si usted tiene los sitomas sigue, vuelve peor o 
si hay sitomas nuevas o para cualquiera preoccupacion incluyendo dolor del 
pecho, falta aire, se siente debile, mas fievre, mas dolor, nausea, vomitando, 
sangrando que no es normal, confusion, baja or pierdas conciencia.



FOLLOW-UP RECOMMENDATIONS:

RECOMMEND FOLLOW-UP WITH A PRIMARY CARE PROVIDER OR SPECIALIST IN 2-5 DAYS, 
ESPECIALLY IF NO IMPROVEMENT IN SYMPTOMS.



MAY FOLLOW-UP WITH A PROVIDER OF YOUR CHOICE, SUCH AS:

1. A PHYSICIAN OF YOUR CHOICE

2. [facility], [phone]. LOCATIONS IN [facility]

3. [facility], [address], [place]; 
[phone]



OR, IF YOU WISH TO FOLLOW-UP WITHIN THE [facility], MAY TRY THESE 
OPTIONS (CLINIC APPOINTMENTS AVAILABLE ON CASE-BY-CASE BASIS):

1. SCHEDULE AN APPOINTMENT ONLINE AT [email or web address]

2. OR CALL THE [facility] AT [phone] OR [phone]

3. OR CALL YOUR [facility] PHYSICIAN'S OFFICE DIRECTLY IF YOU ARE ALREADY AN 
ESTABLISHED [facility] PATIENT.



AttachmentsThe following attachments cannot be sent through Care Everywhere.
Gastroenteritis, Noninfectious (English)documented in this encounter



ED Notes

Leela Dowling RN - 10/29/2020  5:19 PM CDTCC:Pt presents to ER with 
complaints of diarrhea and chills x 2 days. Denies fever, nausea, vomiting.



PMHx:    Asthma



PSH: Denies



MEDS: Denies



Tetanus:   UTD

Awake, alert, oriented, resp reg unlabored, skin warm , color appropriate for 
race, moves all ext without difficulty, amb without assist.



Appears in no distress



Electronically signed by Leela Dowling RN at 10/29/2020  5:20 PM CDT
documented in this encounter



Miscellaneous Notes

ED Nurse Note - Teodora Fernandez RN - 10/29/2020  9:41 PM CDTPt given 
printed and verbal discharge instructions regarding gastroenteritis, encouraged 
hydration,



Prescriptions provided for Zofran.



Pt verbalized understanding of instructions, pt awake alert oriented, resp reg 
unlabored, skin w/d, color appropriate for race, moves all ext well,pt 
encouraged to follow up with pcp



Advised to  seek medical attention for new/prolonged/worsening of symptoms,

Symptoms were addressed.



No adverse reaction to meds given in ER noted upon discharge



PIV d'cd, dressing to site, catheter in tact.



Awake, alert oriented, resp reg unlabored, skin w/d,  pt leaving amb with steady
gait, in no apparent distress,



Electronically signed by Teodora Fernandez RN at 10/29/2020  9:42 PM CDTED 
Nurse Note - Teodora Fernandez RN - 10/29/2020  9:21 PM CDTPatient sitting 
at bedside at this time. Patient reports that his pain is improving. Updated 
patienton plan of care. IVF infusing. No S/S of distress. Will continue to 
monitor.Electronically signed by Teodora Fernandez RN at 10/29/2020  9:22 PM
CDTdocumented in this encounter



Plan of Treatment







             Name         Type         Priority     Associated Diagnoses Date/Ti

me

 

             CT ABDOMEN PELVIS W IMAGING      STAT         Diarrhea, unspecified

 10/29/2020  8:20 PM



                CONTRAST                                        type



                                        CDT



                                                    Epigastric pain 

 

             CORONAVIRUS COVID-19 LAB          STAT         Diarrhea, unspecifie

d 10/29/2020  8:42 PM



                TESTING                                         type



                                        CDT



                                                    Epigastric pain 









             Name         Type         Priority     Associated Diagnoses Order S

chedule

 

             CORONAVIRUS COVID-19 LAB          Routine      Diarrhea, unspecifie

d ONCE for 1 Occurrences



                TESTING                                         type



                                        starting 10/29/2020



                                                    Epigastric pain until 10/29/

2020









                Health Maintenance Due Date        Last Done       Comments

 

                VARICELLA VACCINES (1 of 2 - 1990                      



                2-dose childhood series)                                 

 

                Depression Screening 2001                      

 

                DTaP,Tdap,and Td Vaccines (1 - 2008                      



                Tdap)                                           

 

                INFLUENZA VACCINE (#1) 2020                      

 

                PNEUMOCOCCAL 0-64 YEARS COMBINED Aged Out                       

 No longer eligible based on



                SERIES                                          patient's age to

 complete this



                                                                topic



documented as of this encounter



Procedures







             Procedure Name Priority     Date/Time    Associated Diagnosis Comme

nts

 

             CT ABDOMEN PELVIS W STAT         10/29/2020  8:20 PM Diarrhea, unsp

ecified 



                CONTRAST                        CDT             type



                                        



                                                    Epigastric pain 









                                                    Procedure Note - [facility], [facility]

nt Results Inft User - 10/29/2020  9:13 PM CDT



EXAM: CT ABDOMEN AND PELVIS WITH CONTRAST



                                                    



                                                    HISTORY: 30-year-old male wi

th acute abdominal pain, nausea, and fever.



                                                    



                                                    COMPARISON: CT abdomen and p

freddy on 2020



                                                    



                                                    TECHNIQUE AND FINDINGS: Cont

iguous axial imaging from the level of the lung



                                                    bases through the pubic symp

hysis was performed after the uncomplicated



                                                    administration of 120 cc of 

intravenous Omnipaque contrast. Coronal and



                                                    sagittal reconstructions wer

e obtained.  Auto mA and/or iterative



                                                    reconstruction were used to 

reduce radiation dose.



                                                    



                                                    FINDINGS:



                                                    



                                                    LOWER THORAX: The lung bases

 are clear. No cardiomegaly.



                                                    



                                                    LIVER: No focal hepatic lesi

ons. Normal contour. The liver is enlarged and



                                                    measures 20 cm. It is diffus

ely hypoattenuating, consistent with fatty



                                                    infiltration with focal spar

ing around gallbladder fossa.



                                                    



                                                    GALLBLADDER AND BILIARY TREE

: No biliary ductal dilation. No gallbladder



                                                    wall thickening.



                                                    



                                                    SPLEEN: No splenomegaly.



                                                    



                                                    PANCREAS: No ductal dilation

 or masses.



                                                    



                                                    ADRENAL GLANDS: No adrenal n

odules.



                                                    



                                                    KIDNEYS: No hydronephrosis, 

stones, or masses.



                                                    



                                                    PERITONEUM AND RETROPERITONE

UM: No free air or fluid.



                                                    



                                                    LYMPH NODES: No lymphadenopa

thy.



                                                    



                                                    GI TRACT: No dilation or wal

l thickening.



                                                    



                                                    PELVIS/BLADDER: Unremarkable

.



                                                    



                                                    VESSELS: Unremarkable.



                                                    



                                                    BONES AND SOFT TISSUES: No s

uspicious lytic or sclerotic bony lesions.



                                                    



                                                    IMPRESSION



                                                    



                                                    1.  No acute findings in the

 abdomen or pelvis.



                                                    



                                                    2.  Hepatomegaly with diffus

e fatty infiltration. No focal hepatic lesion.



                                                    



                                                    



                                                    



                                                    



                                                    Preliminary Report Dictated 

by Resident: Yanni Cota









             URINALYSIS   STAT         10/29/2020  7:05 PM Diarrhea,    Results 

for this



                                       CDT          unspecified type procedure a

re in



                                                                 the results



                                                                 section.

 

             CBC WITH DIFF STAT         10/29/2020  7:05 PM Diarrhea,    Results

 for this



                                       CDT          unspecified type procedure a

re in



                                                                 the results



                                                                 section.

 

             COMP. METABOLIC STAT         10/29/2020  7:05 PM Diarrhea,    Resul

ts for this



             PANEL ([id number])              CDT          unspecified type procedure 

are in



                                                                 the results



                                                                 section.

 

             LIPASE       STAT         10/29/2020  7:05 PM Diarrhea,    Results 

for this



                                       CDT          unspecified type procedure a

re in



                                                                 the results



                                                                 section.

 

             NOTICE OF PRIVACY Routine      10/29/2020  4:52 PM              



             PRACTICES                 CDT                       

 

             CONSENT/REFUSAL FOR Routine      10/29/2020  4:51 PM              



             DIAGNOSIS AND              CDT                       



             TREATMENT                                           



documented in this encounter



Results

Urinalysis (10/29/2020  7:05 PM CDT)





             Component    Value        Ref Range    Performed At Pathologist Sig

nature

 

             APPEARANCE   Clear        Clear        [facility] 



                                                    LABORATORY   

 

             COLOR        Yellow       Yellow       [facility] 



                                                    LABORATORY   

 

             PH           6.0          4.8 - 8.0    [facility] 



                                                    LABORATORY   

 

             SP GRAVITY   1.019        1.003 - 1.030 [facility] 



                                                    LABORATORY   

 

             GLU U QUAL   Normal       Normal       [facility] 



                                                    LABORATORY   

 

             BLOOD        Negative     Negative     [facility] 



                                                    LABORATORY   

 

             KETONES      Negative     Negative     [facility] 



                                                    LABORATORY   

 

             PROTEIN      Negative     Negative     [facility] 



                                                    LABORATORY   

 

             UROBILIN     Normal       Normal       [facility] 



                                                    LABORATORY   

 

             BILIRUBIN    Negative     Negative     [facility] 



                                                    LABORATORY   

 

             NITRITE      Negative     Negative     [facility] 



                                                    LABORATORY   

 

             LEUK TAHIR   Negative     Negative     [facility] 



                                                    LABORATORY   

 

             RBC/HPF      3            0 - 3 HPF    [facility] 



                                                    LABORATORY   

 

             WBC/HPF      <1           0 - 5 HPF    [facility] 



                                                    LABORATORY   

 

             BACTERIA     Few (A)      Negative     [facility] 



                                                    LABORATORY   

 

             MUCOUS       Slight (A)   Negative LPF [facility] 



                                                    LABORATORY   

 

             SQ EPITH     <1           HPF          [facility] 



                                                    LABORATORY   









                                        Specimen

 

                                        Urine - URINE, CLEAN CATCH









                Performing Organization Address         City/[facility]/Zipcode Phone

 Number

 

                          [facility] CLIA: [id number]



                          [place] [address]        [phone]



                LABORATORY      [address]                 



Lipase Serum (10/29/2020  7:05 PM CDT)





             Component    Value        Ref Range    Performed At Pathologist Sig

nature

 

             LIPASE       143          0 - 220 U/L  [facility] 



                                                    LABORATORY   









                                        Specimen

 

                                        Blood - VENOUS









                Performing Organization Address         City/[facility]/Zipcode Phone

 Number

 

                          [facility] CLIA: [id number]



                          [place] [address]        [phone]



                LABORATORY      [address]                 



COMP. METABOLIC PANEL (75346) (10/29/2020  7:05 PM CDT)





             Component    Value        Ref Range    Performed At Pathologist



                                                                 Signature

 

             NA           135          135 - 145    [facility] 



                                       mmol/L       [facility] LABORATORY 

 

             K            4.3          3.5 - 5.0    [facility] 



                                       mmol/L       [facility] LABORATORY 

 

             CL           101          98 - 108 mmol/L [facility] LAB[facility]ATORY 

 

             CO2 TOTAL    28           23 - 31 mmol/L [facility] LAB[facility]AT[facility] 

 

             AGAP         6            2 - 16       [facility] LABORATORY 

 

             BUN          11           7 - 23 mg/dL [facility] LAB[facility]AT[facility] 

 

             GLUCOSE      94           70 - 110 mg/dL [facility] LAB[facility]ATORY 

 

             CREATININE   0.56 (L)     0.60 - 1.25  [facility] 



                                       mg/dL        [facility] LABORATORY 

 

             TOTAL BILI   0.6          0.1 - 1.1 mg/dL [facility] LABORATORY 

 

             CALCIUM      9.3          8.6 - 10.6   [facility] 



                                       mg/dL        [facility] LABORATORY 

 

             T PROTEIN    7.7          6.3 - 8.2 g/dL [facility] LAB[facility]AT[facility] 

 

             ALBUMIN      3.9          3.5 - 5.0 g/dL [facility] LAB[facility]AT[facility] 

 

             ALK PHOS     81           34 - 122 U/L [facility] LAB[facility]AT[facility] 

 

             ALTv         44           5 - 50 U/L   [facility] LABORATORY 

 

             AST(SGOT)    47 (H)       13 - 40 U/L  [facility] LABORATORY 

 

             eGFR Calculation 170.2        mL/min/1.73m2 [facility] 



             (Non-[facility] LABORATORY 



             American)                                           

 

             eGFR Calculation 206.2        mL/min/1.73m2 [facility] 



             (African American)                           [facility] LABORATORY 









                                        Specimen

 

                                        Blood - VENOUS









                          Narrative                 Performed At

 

                          [facility] Glomerular Filtration Rate (GFR) [facility] 

LABORATORY



                                        and Staging of Kidney Disease*



                                        



                                         



                                        



                          +-----------------------+---------------------+- 



                                        ------------------------+



                                        



                          | GFR (mL/min/1.73 m2) | With Kidney Damage 



                                        | Without Kidney Damage



                                        



                          +-----------------------+---------------------+- 



                                        ------------------------+



                                        



                          | >90         | Stage one 



                              |  Normal        



                                        



                                        



                          +-----------------------+---------------------+- 



                                        ------------------------+



                                        



                          | 60-89        | Stage two 



                                            |  Decreased GFR   

  



                                        



                          +-----------------------+---------------------+- 



                                        ------------------------+



                                        



                          | 30-59        | Stage three 



                                           |  Stage three     

 



                                        



                          +-----------------------+---------------------+- 



                                        ------------------------+



                                        



                          | 15-29        | Stage four 



                                            |  Stage four     

 



                                        



                          +-----------------------+---------------------+- 



                                        ------------------------+



                                        



                          | <15 (or dialysis)  | Stage five   



                                          |  Stage five      



                                        



                          +-----------------------+---------------------+- 



                                        ------------------------+



                                        



                                         



                                        



                          *Each stage assumes the associated GFR level has 



                          been in effect for at least three months. 



                          Stages 1 to 5, with or without kidney disease, 



                                        indicate chronic kidney disease.



                                        



                                         



                                        



                          Notes: Determination of stages one and two (with 



                          eGFR >59mL/min/1.73 m2) requires estimation of 



                          kidney damage for at least three months as 



                          defined by structural or functional 



                          abnormalities of the kidney, manifested by 



                                        either:



                                        



                          Pathological abnormalities or Markers of kidney 



                          damage (including abnormalities in the 



                          composition of the blood or urine or 



                                        abnormalities in imaging tests). 



                                        



                                                    









                Performing Organization Address         City/State/Zipcode Phone

 Number

 

                          [facility] CLIA: [id number]



                          [place] [address]        [phone]



                LABORATORY      [address]                 



CBC with Differential (10/29/2020  7:05 PM CDT)





             Component    Value        Ref Range    Performed At Pathologist



                                                                 Signature

 

             WBC          11.29 (H)    4.20 - 10.70 [facility] 



                                       10*3/L     [facility]     



                                                    LABORATORY   

 

             RBC          4.93         4.26 - 5.52  [facility] 



                                       10*6/L     [facility]     



                                                    LABORATORY   

 

             HGB          14.9         12.2 - 16.4  [facility] 



                                       g/dL         [facility]     



                                                    LABORATORY   

 

             HCT          45.6         38.4 - 49.3 % [facility]     



                                                    LABORATORY   

 

             MCV          92.5         81.7 - 95.6  [facility]     



                                                    LABORATORY   

 

             MCH          30.2         26.1 - 32.7  [facility]     



                                                    LABORATORY   

 

             MCHC         32.7         31.2 - 35.0  [facility] 



                                       g/dL         [facility]     



                                                    LABORATORY   

 

             RDW-SD       44.4         38.5 - 51.6  [facility]     



                                                    LABORATORY   

 

             RDW-CV       13.1         12.1 - 15.4 % [facility]     



                                                    LABORATORY   

 

             PLT          297          150 - 328    [facility] 



                                       10*3/L     [facility]     



                                                    LABORATORY   

 

             MPV          10.5         9.8 - 13.0 [facility]     



                                                    LABORATORY   

 

             IPF %        2.9Comment: Platelet 1.2 - 10.7 % [facility] 



                          count measured by              HOSPITAL     



                          fluorescence method.              LABORATORY   

 

             NRBC/100 WBC 0.0          0.0 - 10.0   [facility] 



                                       /100 WBCs    [facility]     



                                                    LABORATORY   

 

             NRBC x10^3   <0.01        10*3/L     [facility]     



                                                    LABORATORY   

 

             GRAN MAT (NEUT) % 67.3         %            [facility]     



                                                    LABORATORY   

 

             IMM GRAN %   0.60         %            [facility]     



                                                    LABORATORY   

 

             LYMPH %      18.4         %            [facility]     



                                                    LABORATORY   

 

             MONO %       11.0         %            [facility]     



                                                    LABORATORY   

 

             EOS %        1.8          %            [facility]     



                                                    LABORATORY   

 

             BASO %       0.9          %            [facility]     



                                                    LABORATORY   

 

             GRAN MAT     7.60 (H)     1.99 - 6.95  [facility] 



             x10^3(ANC)                10*3/uL      [facility]     



                                                    LABORATORY   

 

             IMM GRAN x10^3 0.07 (H)     0.00 - 0.06  [facility] 



                                       10*3/uL      [facility]     



                                                    LABORATORY   

 

             LYMPH x10^3  2.08         1.09 - 3.23  [facility] 



                                       10*3/uL      [facility]     



                                                    LABORATORY   

 

             MONO x10^3   1.24 (H)     0.36 - 1.02  [facility] 



                                       10*3/uL      [facility]     



                                                    LABORATORY   

 

             EOS x10^3    0.20         0.06 - 0.53  [facility] 



                                       10*3/uL      [facility]     



                                                    LABORATORY   

 

             BASO x10^3   0.10 (H)     0.01 - 0.09  [facility] 



                                       10*3/uL      [facility]     



                                                    LABORATORY   









                                        Specimen

 

                                        Blood - VENOUS









                Performing Organization Address         City/State/Zipcode Phone

 Number

 

                          [facility] CLIA: [id number]



                          [place] [address]        [phone]



                LABORATORY      [address]                 



documented in this encounter



Visit Diagnoses







                                        Diagnosis

 

                                        Diarrhea, unspecified type - Primary

 

                                        Epigastric pain







                                        Abdominal pain, epigastric

 

                                        Gastroenteritis







                                        Other and unspecified noninfectious angeles

roenteritis and colitis

 

                                        Nausea







                                        Nausea alone



documented in this encounter



Administered Medications







           Medication Order MAR Action Action Date Dose       Rate       Site

 

           iohexol (OMNIPAQUE 350 BULK-150 Given      10/29/2020  8:30 PM CDT 12

0 mL                



                                        mL) injection 120 mL



                                                                 



           120 mL, Intravenous, ONCE, 1                                         

    



           dose, Thu 10/29/20 at 2030,                                          

   



           Routine                                                









                                                    









             NaCl 0.9% (NS) bolus infusion New Bag      10/29/2020  7:07 PM CDT 

1,000 mL     999 mL/hr

                                        



                                        1,000 mL



                                                                 



           at 999 mL/hr, 1,000 mL, IV                                           

  



           Infusion, ONCE, 1 dose, Thu                                          

   



           10/29/20 at 1830, ASAP                                             









                                                    



documented in this encounter



Additional Health Concerns







                Infection       Onset Date      Last Indicated  Resolved Time

 

                COVID-19 Rule Out 10/29/2020      10/29/2020      



documented as of this encounter

## 2021-01-14 NOTE — ER
Nurse's Notes                                                                                     

 [facility]                                                                 

Name: Koko Soto                                                                                  

Age: 30 yrs                                                                                       

Sex: Male                                                                                         

: 1990                                                                                   

MRN: [id number]                                                                                   

Arrival Date: 2021                                                                          

Time: 11:23                                                                                       

Account#: [id number]                                                                            

Bed 20                                                                                            

Private MD:                                                                                       

Diagnosis: Diarrhea, unspecified                                                                  

                                                                                                  

Presentation:                                                                                     

                                                                                             

11:37 Chief complaint: Patient states: Nausea and diarrhea since 0200 today. Took             ca1 

      anti-diarrheal medication, no relief. Denies fever. Coronavirus screen: Client denies       

      travel out of the [place] in the last 14 days. diarrhea, nausea, Client presents with at       

      least one sign or symptom that may indicate coronavirus-19. Standard/surgical mask          

      placed on the client. Provider contacted for isolation considerations. The client           

      reports previous COVID testing was negative. Date of collection: 2020. Ebola     

      Screen: Patient negative for fever greater than or equal to 101.5 degrees Fahrenheit,       

      and additional compatible Ebola Virus Disease symptoms Patient denies exposure to           

      infectious person. Patient denies travel to an Ebola-affected area in the 21 days           

      before illness onset. No symptoms or risks identified at this time. Initial Sepsis          

      Screen: Does the patient meet any 2 criteria? No. Patient's initial sepsis screen is        

      negative. Does the patient have a suspected source of infection? No. Patient's initial      

      sepsis screen is negative. Risk Assessment: Do you want to hurt yourself or someone         

      else? Patient reports no desire to harm self or others. Onset of symptoms was 2021 at 02:00.                                                                          

11:37 Method Of Arrival: Ambulatory                                                           ca1 

11:37 Acuity: CAS 3                                                                           ca1 

                                                                                                  

Triage Assessment:                                                                                

11:45 General: Appears in no apparent distress. uncomfortable, Behavior is cooperative,       bp  

      appropriate for age, anxious. Pain: Denies pain. EENT: No deficits noted. Neuro: No         

      deficits noted. Cardiovascular: No deficits noted. Respiratory: No deficits noted. GI:      

      Abdomen is non-distended, Reports diarrhea, nausea. : No signs and/or symptoms were       

      reported regarding the genitourinary system. Derm: No deficits noted. Musculoskeletal:      

      No deficits noted.                                                                          

                                                                                                  

Historical:                                                                                       

- Allergies:                                                                                      

11:39 No Known Allergies;                                                                     ca1 

- Home Meds:                                                                                      

11:39 None [Active];                                                                          ca1 

- PMHx:                                                                                           

11:39 Asthma;                                                                                 ca1 

- PSHx:                                                                                           

11:39 None;                                                                                   ca1 

                                                                                                  

- Immunization history:: Adult Immunizations up to date.                                          

- Social history:: Smoking status: Patient denies any tobacco usage or history of.                

                                                                                                  

                                                                                                  

Screenin:45 Abuse screen: Denies threats or abuse. Denies injuries from another. Nutritional        bp  

      screening: No deficits noted. Tuberculosis screening: No symptoms or risk factors           

      identified. Fall Risk None identified.                                                      

                                                                                                  

Assessment:                                                                                       

11:45 General: SEE TRIAGE NOTE.                                                               bp  

13:26 Reassessment: Patient appears in no apparent distress at this time. No changes from     bp  

      previously documented assessment. Patient and/or family updated on plan of care and         

      expected duration. Pain level reassessed. Patient is alert, oriented x 3, equal             

      unlabored respirations, skin warm/dry/pink. ALL CURRENT ORDERS COMPLETED, IV IN PLACE       

      AND INFUSING.                                                                               

                                                                                                  

Vital Signs:                                                                                      

11:37  / 62; Pulse 83; Resp 16 S; Temp 98.8(O); Pulse Ox 98% on R/A; Weight 167.83 kg   ca1 

      (R); Height 5 ft. 7 in. (170.18 cm) (R); Pain 6/10;                                         

13:25  / 81; Pulse 81; Resp 17; Pulse Ox 100% ;                                         bp  

11:37 Body Mass Index 57.95 (167.83 kg, 170.18 cm)                                            ca1 

                                                                                                  

ED Course:                                                                                        

11:23 Patient arrived in ED.                                                                  as  

11:30 Mitesh Desai PA is PHCP.                                                              [facility] 

11:30 Isia Lamb MD is Attending Physician.                                              jm 

11:37 Srinivasan Morrell, RN is Primary Nurse.                                                    bp  

11:38 Triage completed.                                                                       ca1 

11:39 Arm band placed on right wrist.                                                         ca1 

11:45 Patient has correct armband on for positive identification. Bed in low position. Call   bp  

      light in reach. Side rails up X2.                                                           

11:45 Inserted saline lock: 22 gauge in left antecubital area, using aseptic technique.       bp  

14:11 No provider procedures requiring assistance completed. IV discontinued, intact,         iw  

      bleeding controlled, No redness/swelling at site. Pressure dressing applied.                

                                                                                                  

Administered Medications:                                                                         

12:15 Drug: NS 0.9% 1000 ml Route: IV; Rate: 1 bolus; Site: left antecubital;                 bp  

14:12 Follow up: IV Status: Completed infusion                                                iw  

                                                                                                  

                                                                                                  

Outcome:                                                                                          

13:48 Discharge ordered by MD.                                                                jmm 

14:11 Discharged to home ambulatory.                                                          iw  

14:11 Condition: good                                                                             

14:11 Discharge instructions given to patient, Instructed on discharge instructions, follow       

      up and referral plans. Demonstrated understanding of instructions, follow-up care.          

14:12 Patient left the ED.                                                                    iw  

                                                                                                  

Signatures:                                                                                       

Mitesh Desai PA PA jmm Martinez, Amelia as Williams, Irene, RN                     RN   Srinivasan Santiago RN                      RN   bp                                                   

Cristal Abbott RN RN   ca1                                                  

                                                                                                  

**************************************************************************************************